# Patient Record
Sex: MALE | Race: WHITE | NOT HISPANIC OR LATINO | Employment: FULL TIME | URBAN - METROPOLITAN AREA
[De-identification: names, ages, dates, MRNs, and addresses within clinical notes are randomized per-mention and may not be internally consistent; named-entity substitution may affect disease eponyms.]

---

## 2017-12-11 ENCOUNTER — HOSPITAL ENCOUNTER (OUTPATIENT)
Dept: LAB | Facility: MEDICAL CENTER | Age: 27
End: 2017-12-11
Attending: INTERNAL MEDICINE
Payer: COMMERCIAL

## 2017-12-11 ENCOUNTER — OFFICE VISIT (OUTPATIENT)
Dept: INTERNAL MEDICINE | Facility: MEDICAL CENTER | Age: 27
End: 2017-12-11
Payer: COMMERCIAL

## 2017-12-11 VITALS
SYSTOLIC BLOOD PRESSURE: 112 MMHG | HEART RATE: 86 BPM | DIASTOLIC BLOOD PRESSURE: 76 MMHG | OXYGEN SATURATION: 94 % | TEMPERATURE: 98.3 F | HEIGHT: 74 IN | WEIGHT: 228 LBS | BODY MASS INDEX: 29.26 KG/M2

## 2017-12-11 DIAGNOSIS — J06.9 UPPER RESPIRATORY TRACT INFECTION, UNSPECIFIED TYPE: ICD-10-CM

## 2017-12-11 DIAGNOSIS — E66.3 OVERWEIGHT (BMI 25.0-29.9): ICD-10-CM

## 2017-12-11 DIAGNOSIS — Z29.9 PREVENTIVE MEASURE: ICD-10-CM

## 2017-12-11 LAB
ALBUMIN SERPL BCP-MCNC: 3.9 G/DL (ref 3.2–4.9)
ALBUMIN/GLOB SERPL: 1.6 G/DL
ALP SERPL-CCNC: 58 U/L (ref 30–99)
ALT SERPL-CCNC: 35 U/L (ref 2–50)
ANION GAP SERPL CALC-SCNC: 6 MMOL/L (ref 0–11.9)
AST SERPL-CCNC: 18 U/L (ref 12–45)
BASOPHILS # BLD AUTO: 0.6 % (ref 0–1.8)
BASOPHILS # BLD: 0.04 K/UL (ref 0–0.12)
BILIRUB SERPL-MCNC: 0.2 MG/DL (ref 0.1–1.5)
BUN SERPL-MCNC: 10 MG/DL (ref 8–22)
CALCIUM SERPL-MCNC: 9 MG/DL (ref 8.5–10.5)
CHLORIDE SERPL-SCNC: 103 MMOL/L (ref 96–112)
CO2 SERPL-SCNC: 29 MMOL/L (ref 20–33)
CREAT SERPL-MCNC: 0.68 MG/DL (ref 0.5–1.4)
EOSINOPHIL # BLD AUTO: 0.17 K/UL (ref 0–0.51)
EOSINOPHIL NFR BLD: 2.5 % (ref 0–6.9)
ERYTHROCYTE [DISTWIDTH] IN BLOOD BY AUTOMATED COUNT: 39.3 FL (ref 35.9–50)
FLUAV+FLUBV AG SPEC QL IA: NEGATIVE
GFR SERPL CREATININE-BSD FRML MDRD: >60 ML/MIN/1.73 M 2
GLOBULIN SER CALC-MCNC: 2.5 G/DL (ref 1.9–3.5)
GLUCOSE SERPL-MCNC: 88 MG/DL (ref 65–99)
HCT VFR BLD AUTO: 44.1 % (ref 42–52)
HETEROPH AB SER QL: NEGATIVE
HGB BLD-MCNC: 15 G/DL (ref 14–18)
IMM GRANULOCYTES # BLD AUTO: 0.02 K/UL (ref 0–0.11)
IMM GRANULOCYTES NFR BLD AUTO: 0.3 % (ref 0–0.9)
INT CON NEG: NEGATIVE
INT CON POS: POSITIVE
LYMPHOCYTES # BLD AUTO: 1.54 K/UL (ref 1–4.8)
LYMPHOCYTES NFR BLD: 22.8 % (ref 22–41)
MCH RBC QN AUTO: 29.1 PG (ref 27–33)
MCHC RBC AUTO-ENTMCNC: 34 G/DL (ref 33.7–35.3)
MCV RBC AUTO: 85.5 FL (ref 81.4–97.8)
MONOCYTES # BLD AUTO: 0.86 K/UL (ref 0–0.85)
MONOCYTES NFR BLD AUTO: 12.8 % (ref 0–13.4)
NEUTROPHILS # BLD AUTO: 4.11 K/UL (ref 1.82–7.42)
NEUTROPHILS NFR BLD: 61 % (ref 44–72)
NRBC # BLD AUTO: 0 K/UL
NRBC BLD AUTO-RTO: 0 /100 WBC
PLATELET # BLD AUTO: 214 K/UL (ref 164–446)
PMV BLD AUTO: 11.5 FL (ref 9–12.9)
POTASSIUM SERPL-SCNC: 3.9 MMOL/L (ref 3.6–5.5)
PROT SERPL-MCNC: 6.4 G/DL (ref 6–8.2)
RBC # BLD AUTO: 5.16 M/UL (ref 4.7–6.1)
SODIUM SERPL-SCNC: 138 MMOL/L (ref 135–145)
WBC # BLD AUTO: 6.7 K/UL (ref 4.8–10.8)

## 2017-12-11 PROCEDURE — 36415 COLL VENOUS BLD VENIPUNCTURE: CPT

## 2017-12-11 PROCEDURE — 86308 HETEROPHILE ANTIBODY SCREEN: CPT

## 2017-12-11 PROCEDURE — 99203 OFFICE O/P NEW LOW 30 MIN: CPT | Mod: 25,GC | Performed by: INTERNAL MEDICINE

## 2017-12-11 PROCEDURE — 85025 COMPLETE CBC W/AUTO DIFF WBC: CPT

## 2017-12-11 PROCEDURE — 87804 INFLUENZA ASSAY W/OPTIC: CPT | Performed by: INTERNAL MEDICINE

## 2017-12-11 PROCEDURE — 80053 COMPREHEN METABOLIC PANEL: CPT

## 2017-12-11 ASSESSMENT — PATIENT HEALTH QUESTIONNAIRE - PHQ9: CLINICAL INTERPRETATION OF PHQ2 SCORE: 0

## 2017-12-11 NOTE — LETTER
December 11, 2017       Patient: Joselito Pool   YOB: 1990   Date of Visit: 12/11/2017         To Whom It May Concern:    It is my medical opinion that Joselito Pool be excused from work today( 12/11/17) and tomorrow ( 12/12/17)    If you have any questions or concerns, please don't hesitate to call [phone]          Sincerely,          Angie Napoles M.D.  Electronically Signed

## 2017-12-12 NOTE — PROGRESS NOTES
New Patient to Establish    Reason to establish: evaluation of new symptoms    CC: cough and fatigue    HPI: 27 yr old male patient with no significant past medical history comes in to establish care and evaluation of ongoing symptoms.  Patient states he started not feeling well about 2 days ago.   Patient has been having dry cough, runny nose, sneezing, watery eyes, fatigue, generalized body aches, mild sinus pains for the past 2 days. He states he does not know if had any sick contacts as he works as .  Patient states his symptoms have gotten worse today.  He initially had chills but did not have later and reported of no fever.  Patient has had no problem sleeping. He has fatigue and today at work he felt like he was going to pass out.  Denies chest pain, palpitations, SOB, nausea,vomiting,diarrhea,constipation, numbness,tingling, ear pain.  Patient states he has had enlarged tonsils due to infections and was supposed to undergo tonsillectomy as a child but did not happen in the past due to unknown reasons.  Patient does not complain of enlarged tonsils or difficulty swallowing today.  Patient did not take flu vaccination this year.    There are no active problems to display for this patient.      History reviewed. No pertinent past medical history.    No current outpatient prescriptions on file.     No current facility-administered medications for this visit.        Allergies as of 12/11/2017   • (No Known Allergies)       Social History     Social History   • Marital status: Single     Spouse name: N/A   • Number of children: N/A   • Years of education: N/A     Occupational History   • Not on file.     Social History Main Topics   • Smoking status: Current Some Day Smoker     Packs/day: 0.25     Years: 10.00   • Smokeless tobacco: Never Used   • Alcohol use Yes      Comment: occ   • Drug use:      Types: Marijuana   • Sexual activity: Yes     Partners: Female     Birth control/ protection: Condom  "    Other Topics Concern   • Not on file     Social History Narrative   • No narrative on file       Family History   Problem Relation Age of Onset   • Dementia Father    • Cancer Father    • Diabetes Father    • Heart Disease Father    • Hypertension Father    • Hyperlipidemia Father    • Alcohol/Drug Father    • Cancer Maternal Grandmother    • Heart Disease Maternal Grandfather        History reviewed. No pertinent surgical history.    ROS: As per HPI. Additional pertinent symptoms as noted below.    Constitutional: Denies fever/weight changes. Positive for chills, fatigue and generalized body aches  Eyes: Denies changes/pain in vision. Positive for watery eyes.  ENT: Positive for congestion, runny nose, sinus discomfort, gritty throat. Denies ear pain.  Cardiovascular: Denies chest pain /palpitations/edema.   Respiratory: Denies SOB/PND/orthopnea.Positive for dry cough.  Abdomen: Denies difficulty swallowing/ diarrhea/constipation/abdominal pain/nausea/vomiting  Genitourinary: Normal urinary habits.   Musculo-skeletal: normal ambulation.Positive for muscle aches.  Skin: Denies rash/lesions.  Neurological: Denies weakness/tingling/numbness/headache  Psychological: good mood and cooperative. Denies anxiety /depression       /76   Pulse 86   Temp 36.8 °C (98.3 °F)   Ht 1.88 m (6' 2\")   Wt 103.4 kg (228 lb)   SpO2 94%   BMI 29.27 kg/m²     Physical Exam  General:  Alert and oriented, No apparent distress.    Eyes: Pupils equal and reactive. No scleral icterus.    Throat: Clear no erythema or exudates noted. Moderately enlarged tonsils.    Neck: Supple. No lymphadenopathy noted. Thyroid not enlarged.    Lungs: Clear to auscultation and percussion bilaterally.    Cardiovascular: Regular rate and rhythm. No murmurs, rubs or gallops.    Abdomen:  Benign. No rebound or guarding noted.    Extremities: No clubbing, cyanosis, edema.    Skin: Clear. No rash or suspicious skin lesions noted.    Neurological: " Oriented to time, place, and person .Cranial nerves intact. No motor/sensory deficits.Reflexes were normal and symmetrical in both upper and lower extremities     Musculoskeletal : NROM of all extremities. No tenderness or deformity noted.       Assessment and Plan    1. Upper respiratory tract infection, unspecified type  - likely due to viral infection  - patient declines any medications use  - influenza swab negative in the clinic  - ordered CBC,CMP, Heterophile antibody screen  - advised to use humidifier, salt water gargling, drink plenty of fluids, wash hands thoroughly with soap to avoid spreading infection  - encouraged adequate rest and hydrate with soups and fluids  - advised and counseled to quit smoking    2. Preventive measure  - declines flu vaccination  - had tetanus vaccination in the past within last 10 yrs.  - not a candidate for DEXA/colonoscopy.  - mammogram or pap smear not applicable  - counseled to quit smoking and patient is willing to quit.  - drinks alcohol occasionally and uses marijuana.    3. Overweight (BMI 25.0-29.9)  - patient advised to eat healthy- DASH diet and exercise regularly atleast 30 mins daily for 5 days a week to lose weight.  - will continue to follow up      Risk Assessment (discuss potential complications a function of chronic problems): spent 35 mins explaining possible complications of his current condition. Educated and counseled about the plans of management    Complexity (discuss number of co-morbidities): discussed URI, overweight, smoking cessation, vaccinations and enlarged tonsils.    Signed by: Angie Napoles M.D.

## 2018-04-06 ENCOUNTER — HOSPITAL ENCOUNTER (EMERGENCY)
Facility: MEDICAL CENTER | Age: 28
End: 2018-04-06
Attending: EMERGENCY MEDICINE
Payer: COMMERCIAL

## 2018-04-06 ENCOUNTER — APPOINTMENT (OUTPATIENT)
Dept: RADIOLOGY | Facility: MEDICAL CENTER | Age: 28
End: 2018-04-06
Attending: EMERGENCY MEDICINE
Payer: COMMERCIAL

## 2018-04-06 VITALS
HEIGHT: 74 IN | DIASTOLIC BLOOD PRESSURE: 82 MMHG | RESPIRATION RATE: 14 BRPM | HEART RATE: 56 BPM | SYSTOLIC BLOOD PRESSURE: 122 MMHG | OXYGEN SATURATION: 97 % | WEIGHT: 222.66 LBS | BODY MASS INDEX: 28.58 KG/M2 | TEMPERATURE: 97.8 F

## 2018-04-06 DIAGNOSIS — N50.812 PAIN IN LEFT TESTICLE: ICD-10-CM

## 2018-04-06 LAB
ANION GAP SERPL CALC-SCNC: 9 MMOL/L (ref 0–11.9)
APPEARANCE UR: ABNORMAL
BACTERIA #/AREA URNS HPF: NEGATIVE /HPF
BASOPHILS # BLD AUTO: 0.6 % (ref 0–1.8)
BASOPHILS # BLD: 0.05 K/UL (ref 0–0.12)
BILIRUB UR QL STRIP.AUTO: NEGATIVE
BUN SERPL-MCNC: 16 MG/DL (ref 8–22)
CALCIUM SERPL-MCNC: 10.1 MG/DL (ref 8.5–10.5)
CHLORIDE SERPL-SCNC: 105 MMOL/L (ref 96–112)
CO2 SERPL-SCNC: 22 MMOL/L (ref 20–33)
COLOR UR: YELLOW
CREAT SERPL-MCNC: 0.67 MG/DL (ref 0.5–1.4)
CULTURE IF INDICATED INDCX: YES UA CULTURE
EOSINOPHIL # BLD AUTO: 0.07 K/UL (ref 0–0.51)
EOSINOPHIL NFR BLD: 0.8 % (ref 0–6.9)
EPI CELLS #/AREA URNS HPF: ABNORMAL /HPF
ERYTHROCYTE [DISTWIDTH] IN BLOOD BY AUTOMATED COUNT: 38.9 FL (ref 35.9–50)
GLUCOSE SERPL-MCNC: 95 MG/DL (ref 65–99)
GLUCOSE UR STRIP.AUTO-MCNC: NEGATIVE MG/DL
HCT VFR BLD AUTO: 47.1 % (ref 42–52)
HGB BLD-MCNC: 16.7 G/DL (ref 14–18)
HYALINE CASTS #/AREA URNS LPF: ABNORMAL /LPF
IMM GRANULOCYTES # BLD AUTO: 0.02 K/UL (ref 0–0.11)
IMM GRANULOCYTES NFR BLD AUTO: 0.2 % (ref 0–0.9)
KETONES UR STRIP.AUTO-MCNC: ABNORMAL MG/DL
LEUKOCYTE ESTERASE UR QL STRIP.AUTO: ABNORMAL
LYMPHOCYTES # BLD AUTO: 2.06 K/UL (ref 1–4.8)
LYMPHOCYTES NFR BLD: 24.2 % (ref 22–41)
MCH RBC QN AUTO: 29.9 PG (ref 27–33)
MCHC RBC AUTO-ENTMCNC: 35.5 G/DL (ref 33.7–35.3)
MCV RBC AUTO: 84.3 FL (ref 81.4–97.8)
MICRO URNS: ABNORMAL
MONOCYTES # BLD AUTO: 0.75 K/UL (ref 0–0.85)
MONOCYTES NFR BLD AUTO: 8.8 % (ref 0–13.4)
NEUTROPHILS # BLD AUTO: 5.58 K/UL (ref 1.82–7.42)
NEUTROPHILS NFR BLD: 65.4 % (ref 44–72)
NITRITE UR QL STRIP.AUTO: NEGATIVE
NRBC # BLD AUTO: 0 K/UL
NRBC BLD-RTO: 0 /100 WBC
PH UR STRIP.AUTO: 8.5 [PH]
PLATELET # BLD AUTO: 214 K/UL (ref 164–446)
PMV BLD AUTO: 10.7 FL (ref 9–12.9)
POTASSIUM SERPL-SCNC: 3.7 MMOL/L (ref 3.6–5.5)
PROT UR QL STRIP: NEGATIVE MG/DL
RBC # BLD AUTO: 5.59 M/UL (ref 4.7–6.1)
RBC # URNS HPF: ABNORMAL /HPF
RBC UR QL AUTO: NEGATIVE
SODIUM SERPL-SCNC: 136 MMOL/L (ref 135–145)
SP GR UR STRIP.AUTO: 1.03
UROBILINOGEN UR STRIP.AUTO-MCNC: 0.2 MG/DL
WBC # BLD AUTO: 8.5 K/UL (ref 4.8–10.8)
WBC #/AREA URNS HPF: ABNORMAL /HPF

## 2018-04-06 PROCEDURE — 85025 COMPLETE CBC W/AUTO DIFF WBC: CPT

## 2018-04-06 PROCEDURE — 80048 BASIC METABOLIC PNL TOTAL CA: CPT

## 2018-04-06 PROCEDURE — 76870 US EXAM SCROTUM: CPT

## 2018-04-06 PROCEDURE — 87086 URINE CULTURE/COLONY COUNT: CPT

## 2018-04-06 PROCEDURE — 99284 EMERGENCY DEPT VISIT MOD MDM: CPT

## 2018-04-06 PROCEDURE — 81001 URINALYSIS AUTO W/SCOPE: CPT

## 2018-04-06 NOTE — ED TRIAGE NOTES
Ambulatory to triage with report of  Chief Complaint   Patient presents with   • Testicle Pain     onset today, lasted about 1 hour, now resolved.  reports stood up quickly from under a car then onset of pain.  denies swelling   Given specimen cup in triage with clean catch urine instructions.

## 2018-04-07 NOTE — DISCHARGE INSTRUCTIONS
You were seen and evaluated in the Emergency Department at [facility] for:     Pain in your left testicle that's gotten better    You had the following tests and studies:    Blood tests, urine test, and ultrasound all look great!    This could be intermittent twisting of the testicle; he looks a friend now if you have new or worse pain he must return immediately is in his little as 4-6 hours he could lose her testicle of twisting his indeed occurring.  ----------------------------    Please make sure to follow up with:    Dr Carter, Urology  [facility] to set up a primary doctor  Return to the ER RIGHT AWAY if this happens again or gets any worse.    Good luck!  ----------------------------    We always encourage patients to return IMMEDIATELY if they have:  Increased or changing pain, passing out, fevers over 100.4 (taken in your mouth or rectally) for more than 2 days, redness or swelling of skin or tissues, feeling like your heart is beating fast, chest pain that is new or worsening, trouble breathing, feeling like your throat is closing up and can not breath, inability to walk, weakness of any part of your body, new dizziness, severe bleeding that won't stop from any part of your body, if you can't eat or drink, or if you have any other concerns.   If you feel worse, please know that you can always return with any questions, concerns, worse symptoms, or you are feeling unsafe. We certainly cannot say for sure that we have ruled out every illness or dangerous disease, but we feel that at this specific time, your exam, tests, and vital signs like heart rate and blood pressure are safe for discharge.

## 2018-04-07 NOTE — ED PROVIDER NOTES
"ED PROVIDER NOTE     Scribed for Bill Romero M.D. by Eugenio Cueto. 4/6/2018, 5:42 PM.    CHIEF COMPLAINT  Chief Complaint   Patient presents with   • Testicle Pain     onset today, lasted about 1 hour, now resolved.  reports stood up quickly from under a car then onset of pain.  denies swelling       HPI    Primary care provider: Angie Napoles M.D.  Means of arrival: Walk-in  History obtained from: Patient  History limited by: None    Joselito Pool is a 27 y.o. male who presents with complaints of left testicle pain, onset 4 hours ago. Patient explains that he was standing up after being under a truck and explains that he had \"sharp\" pain to his left testicle. He has not experienced associated swelling or abdominal pain. Patient also explains that he has this pain lasted for about one hour, but has since resolved. He also denies penile discharge, vomiting, diarrhea, cough, rhinorrhea, or hematuria. The patient does explain he has experienced this one other time in the past and notes that this was over 1.5 years ago. He denies any associated symptoms. No alleviating measures attempted. Denies any bulge or skin changes.     REVIEW OF SYSTEMS  Constitutional: Negative for fever or chills.   HENT: Negative for nosebleeds or sore throat.    Eyes: Negative for vision changes or discharge.   Gastrointestinal: Negative for nausea, vomiting, abdominal pain.   Genitourinary: Negative for dysuria or flank pain. Positive left testicle pain, resolved.  Musculoskeletal: Negative for back pain or joint pain.   Skin: Negative for itching or rash.   Neurological: Negative for sensory or motor changes.   Endo/Heme/Allergies: Negative for weight changes or hives.   All other systems reviewed and are negative.  C.     PAST MEDICAL HISTORY  None      PAST FAMILY HISTORY  Family History   Problem Relation Age of Onset   • Dementia Father    • Cancer Father    • Diabetes Father    • Heart Disease Father    • Hypertension " "Father    • Hyperlipidemia Father    • Alcohol/Drug Father    • Cancer Maternal Grandmother    • Heart Disease Maternal Grandfather        SOCIAL HISTORY  Social History     Social History Main Topics   • Smoking status: Current Some Day Smoker     Packs/day: 0.25     Years: 10.00   • Smokeless tobacco: Never Used   • Alcohol use Yes      Comment: occ   • Drug use: No      Comment: quit 2017   • Sexual activity: Yes     Partners: Female     Birth control/ protection: Condom       SURGICAL HISTORY  patient denies any surgical history    CURRENT MEDICATIONS  Home Medications     Reviewed by Carmelina Lopez, Pharmacy Intern (Pharmacy Intern) on 04/06/18 at 2028  Med List Status: Complete   Medication Last Dose Status        Patient Cong Taking any Medications                       ALLERGIES  No Known Allergies    PHYSICAL EXAM  VITAL SIGNS: /87   Pulse 82   Temp 36.8 °C (98.2 °F)   Resp 16   Ht 1.88 m (6' 2\")   Wt 101 kg (222 lb 10.6 oz)   SpO2 95%   BMI 28.59 kg/m²    Pulse ox interpretation: On room air, I interpret this pulse ox as normal.  Constitutional: No distress. Well-nourished.  HENT: Head is atraumatic. Mucous membranes moist.   Eyes: Conjunctivae are normal. EOMI.   Respiratory: No respiratory distress. Equal chest expansion.   Cardiovascular: RRR, no m/r/g.  Abdomen: Soft, nontender.  : Normal bilateral cremasteric reflex. No testicular masses or hernias. Normal circumcised penis. Normal testicular lie bilaterally. No skin changes.   Musculoskeletal: Normal range of motion. No edema.   Neurological: Alert. No focal deficits noted.    Skin: No rash. No Pallor.   Psych: Appropriate mood. Normal affect.      DIAGNOSTIC STUDIES / PROCEDURES    LABS & EKG  Results for orders placed or performed during the hospital encounter of 04/06/18   URINALYSIS,CULTURE IF INDICATED   Result Value Ref Range    Color Yellow     Character Turbid (A)     Specific Gravity 1.030 <1.035    Ph 8.5 (A) 5.0 - 8.0    " Glucose Negative Negative mg/dL    Ketones Trace (A) Negative mg/dL    Protein Negative Negative mg/dL    Bilirubin Negative Negative    Urobilinogen, Urine 0.2 Negative    Nitrite Negative Negative    Leukocyte Esterase Trace (A) Negative    Occult Blood Negative Negative    Micro Urine Req Microscopic     Culture Indicated Yes UA Culture   CBC WITH DIFFERENTIAL   Result Value Ref Range    WBC 8.5 4.8 - 10.8 K/uL    RBC 5.59 4.70 - 6.10 M/uL    Hemoglobin 16.7 14.0 - 18.0 g/dL    Hematocrit 47.1 42.0 - 52.0 %    MCV 84.3 81.4 - 97.8 fL    MCH 29.9 27.0 - 33.0 pg    MCHC 35.5 (H) 33.7 - 35.3 g/dL    RDW 38.9 35.9 - 50.0 fL    Platelet Count 214 164 - 446 K/uL    MPV 10.7 9.0 - 12.9 fL    Neutrophils-Polys 65.40 44.00 - 72.00 %    Lymphocytes 24.20 22.00 - 41.00 %    Monocytes 8.80 0.00 - 13.40 %    Eosinophils 0.80 0.00 - 6.90 %    Basophils 0.60 0.00 - 1.80 %    Immature Granulocytes 0.20 0.00 - 0.90 %    Nucleated RBC 0.00 /100 WBC    Neutrophils (Absolute) 5.58 1.82 - 7.42 K/uL    Lymphs (Absolute) 2.06 1.00 - 4.80 K/uL    Monos (Absolute) 0.75 0.00 - 0.85 K/uL    Eos (Absolute) 0.07 0.00 - 0.51 K/uL    Baso (Absolute) 0.05 0.00 - 0.12 K/uL    Immature Granulocytes (abs) 0.02 0.00 - 0.11 K/uL    NRBC (Absolute) 0.00 K/uL   BMP   Result Value Ref Range    Sodium 136 135 - 145 mmol/L    Potassium 3.7 3.6 - 5.5 mmol/L    Chloride 105 96 - 112 mmol/L    Co2 22 20 - 33 mmol/L    Glucose 95 65 - 99 mg/dL    Bun 16 8 - 22 mg/dL    Creatinine 0.67 0.50 - 1.40 mg/dL    Calcium 10.1 8.5 - 10.5 mg/dL    Anion Gap 9.0 0.0 - 11.9   ESTIMATED GFR   Result Value Ref Range    GFR If African American >60 >60 mL/min/1.73 m 2    GFR If Non African American >60 >60 mL/min/1.73 m 2   URINE MICROSCOPIC (W/UA)   Result Value Ref Range    WBC 0-2 (A) /hpf    RBC 5-10 (A) /hpf    Bacteria Negative None /hpf    Epithelial Cells Few /hpf    Hyaline Cast 0-2 /lpf         RADIOLOGY  [id number]   Final Result      No testicular  masses.   Bilateral arterial flow within both testes.   No increased vascularity left epididymis nonspecific. Inflammatory changes not excluded.        COURSE & MEDICAL DECISION MAKING    This is a 27 y.o. male who presents with resolved left testicle pain.     Differential Diagnosis includes but is not limited to:  Hernia, torsion, strain, intermittent torsion, UTI    ED Course:  Plan labs, ultrasound, reevaluation.  Cbc with normal wbc, no acidosis on chemistry.  Normal testicular ultrasound.  On recheck patient still asymptomatic.     8:16 PM Paged Urology.     8:24 PM I discussed the patient's case and the above findings with Dr. Oneill (Urology) who is happy to follow up with the patient in clinic as I'm concerned for intermittent torsion with no acute threat to testicle given normal flow today.     Normal flow doubt acute torsion. No e/o hernia on physical. No masses on ultrasound.     8:26 PM - Patient was reevaluated at bedside. He feels well at this time and is sitting comfortably on the gurney. Discussed plans of discharge with the patient including plans of follow-up with Dr. Oneill as the most dangerous cause could be intermittent torsion. He's very reliable and I feel he'll heed my instructions to return immediately for any new pain, given threat to viable testicle in 4-6 hours of pain onset. Patient and wife understand and verbalized agreement with the plan of care.     Medications - No data to display    FINAL IMPRESSION  1. Pain in left testicle        PRESCRIPTIONS  There are no discharge medications for this patient.    FOLLOW UP  Angie Napoles M.D.  [address]  [address]  [facility] [id number] [phone]    Schedule an appointment as soon as possible for a visit in 2 days      Mohinder Oneill M.D.  [address]  [facility] [address]  [phone]    Schedule an appointment as soon as possible for a visit in 3 days      [facility], Emergency Dept  [address]  [place]  [place] [id number] [phone]  Today  If symptoms worsen    -DISCHARGE-    The patient is referred to a primary physician for blood pressure management, diabetic screening, and for all other preventative health concerns. F/U with Urology for a f/u of today's complaint.     Pertinent Labs & Imaging studies reviewed and verified by myself, as well as nursing notes and the patient's past medical, family, and social histories (See chart for details).    Results, exam findings, clinical impression, presumed diagnosis, treatment options, and strict return precautions were discussed with the patient and family, and they verbalized understanding, agreed with, and appreciated the plan of care.    Eugenio DILL (Scribe), am scribing for, and in the presence of, Bill Romero M.D..    Electronically signed by: Eugenio Cueto (Scribe), 4/6/2018    Bill DILL M.D. personally performed the services described in this documentation, as scribed by Eugenio Cueto in my presence, and it is both accurate and complete.    The note accurately reflects work and decisions made by me.  Bill Romero  4/7/2018  10:41 AM

## 2018-04-07 NOTE — ED NOTES
All discharge instructions given to patient. Patient verbalized understanding and has no further questions. Patient gathered all belongings and ambulated with steady gait to ER lobby. See vital signs for discharge vitals. Patient educated on dangers of operating a vehicle after administration of narcotics and/or sedatives. Patient to follow up as instructed in discharge instructions and/or return to the Emergency Room if signs/symptoms worsen or do not improve. Pt to return immediately to the ER if testicular pain returns.

## 2018-04-08 LAB
BACTERIA UR CULT: NORMAL
SIGNIFICANT IND 70042: NORMAL
SITE SITE: NORMAL
SOURCE SOURCE: NORMAL

## 2018-11-30 ENCOUNTER — OFFICE VISIT (OUTPATIENT)
Dept: URGENT CARE | Facility: CLINIC | Age: 28
End: 2018-11-30
Payer: COMMERCIAL

## 2018-11-30 VITALS
HEART RATE: 68 BPM | SYSTOLIC BLOOD PRESSURE: 118 MMHG | WEIGHT: 222 LBS | TEMPERATURE: 98.3 F | BODY MASS INDEX: 28.49 KG/M2 | DIASTOLIC BLOOD PRESSURE: 72 MMHG | RESPIRATION RATE: 14 BRPM | OXYGEN SATURATION: 96 % | HEIGHT: 74 IN

## 2018-11-30 DIAGNOSIS — J06.9 VIRAL URI: ICD-10-CM

## 2018-11-30 PROCEDURE — 99203 OFFICE O/P NEW LOW 30 MIN: CPT | Performed by: PHYSICIAN ASSISTANT

## 2018-11-30 ASSESSMENT — ENCOUNTER SYMPTOMS
DIARRHEA: 0
RHINORRHEA: 1
SWOLLEN GLANDS: 0
ABDOMINAL PAIN: 0
SINUS PAIN: 0
COUGH: 0
SORE THROAT: 1
SHORTNESS OF BREATH: 0
SPUTUM PRODUCTION: 0
FEVER: 0
NECK PAIN: 0
PALPITATIONS: 0
NAUSEA: 1
TINGLING: 0
VOMITING: 1
WHEEZING: 0
HEADACHES: 0
MYALGIAS: 0
FOCAL WEAKNESS: 0
SENSORY CHANGE: 0
CHILLS: 0

## 2018-11-30 NOTE — LETTER
November 30, 2018         Patient: Joselito Pool   YOB: 1990   Date of Visit: 11/30/2018           To Whom it May Concern:    Joselito Pool was seen in my clinic on 11/30/2018. He is excused from work from 11/29/18 and 11/30/18 due to medical reasons.    If you have any questions or concerns, please don't hesitate to call.        Sincerely,           Julita Boateng P.A.-C.  Electronically Signed

## 2018-11-30 NOTE — PROGRESS NOTES
"Subjective:      Joselito Pool is a 28 y.o. male who presents with Cold Symptoms            URI    This is a new problem. Episode onset: 2 days  The problem has been unchanged. Associated symptoms include congestion, nausea, rhinorrhea, a sore throat and vomiting. Pertinent negatives include no abdominal pain, chest pain, coughing, diarrhea, ear pain, headaches, neck pain, plugged ear sensation, rash, sinus pain, sneezing, swollen glands or wheezing. He has tried nothing for the symptoms.         No past medical history on file.    No past surgical history on file.    Family History   Problem Relation Age of Onset   • Dementia Father    • Cancer Father    • Diabetes Father    • Heart Disease Father    • Hypertension Father    • Hyperlipidemia Father    • Alcohol/Drug Father    • Cancer Maternal Grandmother    • Heart Disease Maternal Grandfather        No Known Allergies    Medications, Allergies, and current problem list reviewed today in Epic    Review of Systems   Constitutional: Positive for malaise/fatigue. Negative for chills and fever.   HENT: Positive for congestion, rhinorrhea and sore throat. Negative for ear discharge, ear pain, sinus pain and sneezing.    Respiratory: Negative for cough, sputum production, shortness of breath and wheezing.    Cardiovascular: Negative for chest pain, palpitations and leg swelling.   Gastrointestinal: Positive for nausea and vomiting. Negative for abdominal pain and diarrhea.   Musculoskeletal: Negative for myalgias and neck pain.   Skin: Negative for rash.   Neurological: Negative for tingling, sensory change, focal weakness and headaches.     All other systems reviewed and are negative.        Objective:     /72 (BP Location: Right arm, Patient Position: Sitting, BP Cuff Size: Adult)   Pulse 68   Temp 36.8 °C (98.3 °F) (Temporal)   Resp 14   Ht 1.88 m (6' 2\")   Wt 100.7 kg (222 lb)   SpO2 96%   BMI 28.50 kg/m²      Physical Exam   Constitutional: He is " oriented to person, place, and time. He appears well-developed and well-nourished. No distress.   HENT:   Head: Normocephalic and atraumatic.   Right Ear: Tympanic membrane, external ear and ear canal normal.   Left Ear: Tympanic membrane, external ear and ear canal normal.   Nose: Mucosal edema and rhinorrhea present.   Mouth/Throat: Uvula is midline, oropharynx is clear and moist and mucous membranes are normal.   Eyes: Conjunctivae are normal.   Neck: Neck supple.   Cardiovascular: Normal rate, regular rhythm and normal heart sounds.  Exam reveals no gallop and no friction rub.    No murmur heard.  Pulmonary/Chest: Effort normal and breath sounds normal. No respiratory distress. He has no wheezes. He has no rales.   Lymphadenopathy:     He has no cervical adenopathy.   Neurological: He is alert and oriented to person, place, and time. No cranial nerve deficit.   Skin: Skin is warm and dry. No rash noted.   Psychiatric: He has a normal mood and affect. His behavior is normal. Judgment and thought content normal.               Assessment/Plan:     1. Viral URI         Tobacco cessation counseling was provided to the patient for a duration of three to seven minutes.  Tobacco effects, benefits of cessation and methods to use to achieve this goal were briefly discussed.     Viral etiology discussed. No signs of bacterial illness on exam.  Encouraged conservative treatment.   Suggested fluids, rest, humidification.     Differential diagnoses, Supportive care, and indications for immediate follow-up discussed with patient.   Instructed to return to clinic or nearest emergency department for any change in condition, further concerns, or worsening of symptoms.    The patient demonstrated a good understanding and agreed with the treatment plan.    Julita Boateng P.A.-C.

## 2018-12-11 ENCOUNTER — HOSPITAL ENCOUNTER (EMERGENCY)
Facility: MEDICAL CENTER | Age: 28
End: 2018-12-11
Attending: EMERGENCY MEDICINE
Payer: COMMERCIAL

## 2018-12-11 ENCOUNTER — APPOINTMENT (OUTPATIENT)
Dept: RADIOLOGY | Facility: MEDICAL CENTER | Age: 28
End: 2018-12-11
Attending: EMERGENCY MEDICINE
Payer: COMMERCIAL

## 2018-12-11 VITALS
DIASTOLIC BLOOD PRESSURE: 76 MMHG | WEIGHT: 214.95 LBS | SYSTOLIC BLOOD PRESSURE: 125 MMHG | HEIGHT: 74 IN | RESPIRATION RATE: 18 BRPM | TEMPERATURE: 97.6 F | BODY MASS INDEX: 27.59 KG/M2 | OXYGEN SATURATION: 98 % | HEART RATE: 71 BPM

## 2018-12-11 DIAGNOSIS — R10.13 EPIGASTRIC PAIN: ICD-10-CM

## 2018-12-11 DIAGNOSIS — R11.2 NON-INTRACTABLE VOMITING WITH NAUSEA, UNSPECIFIED VOMITING TYPE: ICD-10-CM

## 2018-12-11 LAB
ALBUMIN SERPL BCP-MCNC: 4.4 G/DL (ref 3.2–4.9)
ALBUMIN/GLOB SERPL: 1.6 G/DL
ALP SERPL-CCNC: 65 U/L (ref 30–99)
ALT SERPL-CCNC: 42 U/L (ref 2–50)
ANION GAP SERPL CALC-SCNC: 8 MMOL/L (ref 0–11.9)
AST SERPL-CCNC: 24 U/L (ref 12–45)
BASOPHILS # BLD AUTO: 0.4 % (ref 0–1.8)
BASOPHILS # BLD: 0.03 K/UL (ref 0–0.12)
BILIRUB SERPL-MCNC: 0.8 MG/DL (ref 0.1–1.5)
BUN SERPL-MCNC: 12 MG/DL (ref 8–22)
CALCIUM SERPL-MCNC: 9.2 MG/DL (ref 8.5–10.5)
CHLORIDE SERPL-SCNC: 104 MMOL/L (ref 96–112)
CO2 SERPL-SCNC: 27 MMOL/L (ref 20–33)
CREAT SERPL-MCNC: 0.66 MG/DL (ref 0.5–1.4)
EOSINOPHIL # BLD AUTO: 0.01 K/UL (ref 0–0.51)
EOSINOPHIL NFR BLD: 0.1 % (ref 0–6.9)
ERYTHROCYTE [DISTWIDTH] IN BLOOD BY AUTOMATED COUNT: 37.6 FL (ref 35.9–50)
GLOBULIN SER CALC-MCNC: 2.7 G/DL (ref 1.9–3.5)
GLUCOSE SERPL-MCNC: 118 MG/DL (ref 65–99)
HCT VFR BLD AUTO: 46.7 % (ref 42–52)
HGB BLD-MCNC: 16.5 G/DL (ref 14–18)
IMM GRANULOCYTES # BLD AUTO: 0.04 K/UL (ref 0–0.11)
IMM GRANULOCYTES NFR BLD AUTO: 0.5 % (ref 0–0.9)
LIPASE SERPL-CCNC: 11 U/L (ref 11–82)
LYMPHOCYTES # BLD AUTO: 1.54 K/UL (ref 1–4.8)
LYMPHOCYTES NFR BLD: 20.6 % (ref 22–41)
MCH RBC QN AUTO: 29.3 PG (ref 27–33)
MCHC RBC AUTO-ENTMCNC: 35.3 G/DL (ref 33.7–35.3)
MCV RBC AUTO: 82.8 FL (ref 81.4–97.8)
MONOCYTES # BLD AUTO: 0.51 K/UL (ref 0–0.85)
MONOCYTES NFR BLD AUTO: 6.8 % (ref 0–13.4)
NEUTROPHILS # BLD AUTO: 5.36 K/UL (ref 1.82–7.42)
NEUTROPHILS NFR BLD: 71.6 % (ref 44–72)
NRBC # BLD AUTO: 0 K/UL
NRBC BLD-RTO: 0 /100 WBC
PLATELET # BLD AUTO: 208 K/UL (ref 164–446)
PMV BLD AUTO: 10.8 FL (ref 9–12.9)
POTASSIUM SERPL-SCNC: 3.1 MMOL/L (ref 3.6–5.5)
PROT SERPL-MCNC: 7.1 G/DL (ref 6–8.2)
RBC # BLD AUTO: 5.64 M/UL (ref 4.7–6.1)
SODIUM SERPL-SCNC: 139 MMOL/L (ref 135–145)
WBC # BLD AUTO: 7.5 K/UL (ref 4.8–10.8)

## 2018-12-11 PROCEDURE — 99285 EMERGENCY DEPT VISIT HI MDM: CPT

## 2018-12-11 PROCEDURE — 96376 TX/PRO/DX INJ SAME DRUG ADON: CPT

## 2018-12-11 PROCEDURE — 96375 TX/PRO/DX INJ NEW DRUG ADDON: CPT

## 2018-12-11 PROCEDURE — 96374 THER/PROPH/DIAG INJ IV PUSH: CPT

## 2018-12-11 PROCEDURE — 700105 HCHG RX REV CODE 258: Performed by: EMERGENCY MEDICINE

## 2018-12-11 PROCEDURE — 700111 HCHG RX REV CODE 636 W/ 250 OVERRIDE (IP): Performed by: EMERGENCY MEDICINE

## 2018-12-11 PROCEDURE — 83690 ASSAY OF LIPASE: CPT

## 2018-12-11 PROCEDURE — 85025 COMPLETE CBC W/AUTO DIFF WBC: CPT

## 2018-12-11 PROCEDURE — 80053 COMPREHEN METABOLIC PANEL: CPT

## 2018-12-11 PROCEDURE — 76705 ECHO EXAM OF ABDOMEN: CPT

## 2018-12-11 PROCEDURE — 36415 COLL VENOUS BLD VENIPUNCTURE: CPT

## 2018-12-11 RX ORDER — MORPHINE SULFATE 10 MG/ML
4 INJECTION, SOLUTION INTRAMUSCULAR; INTRAVENOUS ONCE
Status: COMPLETED | OUTPATIENT
Start: 2018-12-11 | End: 2018-12-11

## 2018-12-11 RX ORDER — ONDANSETRON 2 MG/ML
4 INJECTION INTRAMUSCULAR; INTRAVENOUS ONCE
Status: COMPLETED | OUTPATIENT
Start: 2018-12-11 | End: 2018-12-11

## 2018-12-11 RX ORDER — ONDANSETRON 2 MG/ML
4 INJECTION INTRAMUSCULAR; INTRAVENOUS ONCE
Status: DISCONTINUED | OUTPATIENT
Start: 2018-12-11 | End: 2018-12-11 | Stop reason: HOSPADM

## 2018-12-11 RX ORDER — MORPHINE SULFATE 10 MG/ML
4 INJECTION, SOLUTION INTRAMUSCULAR; INTRAVENOUS ONCE
Status: DISCONTINUED | OUTPATIENT
Start: 2018-12-11 | End: 2018-12-11 | Stop reason: HOSPADM

## 2018-12-11 RX ORDER — OMEPRAZOLE 20 MG/1
20 TABLET, DELAYED RELEASE ORAL DAILY
Qty: 30 TAB | Refills: 0 | Status: SHIPPED | OUTPATIENT
Start: 2018-12-11

## 2018-12-11 RX ORDER — SODIUM CHLORIDE 9 MG/ML
1000 INJECTION, SOLUTION INTRAVENOUS ONCE
Status: COMPLETED | OUTPATIENT
Start: 2018-12-11 | End: 2018-12-11

## 2018-12-11 RX ORDER — SUCRALFATE ORAL 1 G/10ML
1 SUSPENSION ORAL 4 TIMES DAILY
Qty: 500 ML | Refills: 0 | Status: SHIPPED | OUTPATIENT
Start: 2018-12-11

## 2018-12-11 RX ORDER — ONDANSETRON 4 MG/1
4 TABLET, FILM COATED ORAL EVERY 6 HOURS PRN
Qty: 10 EACH | Refills: 0 | Status: SHIPPED | OUTPATIENT
Start: 2018-12-11

## 2018-12-11 RX ADMIN — SODIUM CHLORIDE 1000 ML: 9 INJECTION, SOLUTION INTRAVENOUS at 15:00

## 2018-12-11 RX ADMIN — MORPHINE SULFATE 4 MG: 10 INJECTION INTRAVENOUS at 15:00

## 2018-12-11 RX ADMIN — ONDANSETRON HYDROCHLORIDE 4 MG: 2 INJECTION, SOLUTION INTRAMUSCULAR; INTRAVENOUS at 15:00

## 2018-12-11 RX ADMIN — ONDANSETRON HYDROCHLORIDE 4 MG: 2 INJECTION, SOLUTION INTRAMUSCULAR; INTRAVENOUS at 17:00

## 2018-12-11 RX ADMIN — MORPHINE SULFATE 4 MG: 10 INJECTION INTRAVENOUS at 17:00

## 2018-12-11 ASSESSMENT — PAIN SCALES - GENERAL
PAINLEVEL_OUTOF10: 8
PAINLEVEL_OUTOF10: 10

## 2018-12-11 NOTE — ED TRIAGE NOTES
Chief Complaint   Patient presents with   • Abdominal Pain     pt states waking in the middle of the night vomiting/ states coffee grounds in emesis/ hx of ulcer and vomiting   • Vomiting   • N/V     Explained to pt triage process, made pt aware to tell this RN/staff of any changes/concerns, pt verbalized understanding of process and instructions given. Pt to ER lobby.

## 2018-12-12 ENCOUNTER — APPOINTMENT (OUTPATIENT)
Dept: RADIOLOGY | Facility: MEDICAL CENTER | Age: 28
End: 2018-12-12
Attending: EMERGENCY MEDICINE
Payer: COMMERCIAL

## 2018-12-12 ENCOUNTER — HOSPITAL ENCOUNTER (EMERGENCY)
Facility: MEDICAL CENTER | Age: 28
End: 2018-12-13
Attending: EMERGENCY MEDICINE
Payer: COMMERCIAL

## 2018-12-12 VITALS
WEIGHT: 215 LBS | HEART RATE: 82 BPM | BODY MASS INDEX: 27.59 KG/M2 | SYSTOLIC BLOOD PRESSURE: 137 MMHG | TEMPERATURE: 99.1 F | DIASTOLIC BLOOD PRESSURE: 81 MMHG | RESPIRATION RATE: 19 BRPM | HEIGHT: 74 IN | OXYGEN SATURATION: 93 %

## 2018-12-12 DIAGNOSIS — R10.13 EPIGASTRIC ABDOMINAL PAIN: ICD-10-CM

## 2018-12-12 LAB
ALBUMIN SERPL BCP-MCNC: 4.6 G/DL (ref 3.2–4.9)
ALBUMIN/GLOB SERPL: 1.8 G/DL
ALP SERPL-CCNC: 67 U/L (ref 30–99)
ALT SERPL-CCNC: 45 U/L (ref 2–50)
ANION GAP SERPL CALC-SCNC: 11 MMOL/L (ref 0–11.9)
APPEARANCE UR: CLEAR
AST SERPL-CCNC: 24 U/L (ref 12–45)
BASOPHILS # BLD AUTO: 0.5 % (ref 0–1.8)
BASOPHILS # BLD: 0.04 K/UL (ref 0–0.12)
BILIRUB SERPL-MCNC: 1.2 MG/DL (ref 0.1–1.5)
BILIRUB UR QL STRIP.AUTO: NEGATIVE
BUN SERPL-MCNC: 10 MG/DL (ref 8–22)
CALCIUM SERPL-MCNC: 9.3 MG/DL (ref 8.5–10.5)
CHLORIDE SERPL-SCNC: 98 MMOL/L (ref 96–112)
CO2 SERPL-SCNC: 25 MMOL/L (ref 20–33)
COLOR UR: YELLOW
CREAT SERPL-MCNC: 0.69 MG/DL (ref 0.5–1.4)
EOSINOPHIL # BLD AUTO: 0.02 K/UL (ref 0–0.51)
EOSINOPHIL NFR BLD: 0.2 % (ref 0–6.9)
ERYTHROCYTE [DISTWIDTH] IN BLOOD BY AUTOMATED COUNT: 36.3 FL (ref 35.9–50)
GLOBULIN SER CALC-MCNC: 2.6 G/DL (ref 1.9–3.5)
GLUCOSE SERPL-MCNC: 109 MG/DL (ref 65–99)
GLUCOSE UR STRIP.AUTO-MCNC: NEGATIVE MG/DL
HCT VFR BLD AUTO: 44.9 % (ref 42–52)
HGB BLD-MCNC: 15.9 G/DL (ref 14–18)
IMM GRANULOCYTES # BLD AUTO: 0.03 K/UL (ref 0–0.11)
IMM GRANULOCYTES NFR BLD AUTO: 0.4 % (ref 0–0.9)
KETONES UR STRIP.AUTO-MCNC: 40 MG/DL
LEUKOCYTE ESTERASE UR QL STRIP.AUTO: NEGATIVE
LIPASE SERPL-CCNC: 37 U/L (ref 11–82)
LYMPHOCYTES # BLD AUTO: 1.8 K/UL (ref 1–4.8)
LYMPHOCYTES NFR BLD: 22 % (ref 22–41)
MCH RBC QN AUTO: 28.8 PG (ref 27–33)
MCHC RBC AUTO-ENTMCNC: 35.4 G/DL (ref 33.7–35.3)
MCV RBC AUTO: 81.2 FL (ref 81.4–97.8)
MICRO URNS: ABNORMAL
MONOCYTES # BLD AUTO: 0.81 K/UL (ref 0–0.85)
MONOCYTES NFR BLD AUTO: 9.9 % (ref 0–13.4)
NEUTROPHILS # BLD AUTO: 5.49 K/UL (ref 1.82–7.42)
NEUTROPHILS NFR BLD: 67 % (ref 44–72)
NITRITE UR QL STRIP.AUTO: NEGATIVE
NRBC # BLD AUTO: 0 K/UL
NRBC BLD-RTO: 0 /100 WBC
PH UR STRIP.AUTO: 6 [PH]
PLATELET # BLD AUTO: 210 K/UL (ref 164–446)
PMV BLD AUTO: 10 FL (ref 9–12.9)
POTASSIUM SERPL-SCNC: 3.5 MMOL/L (ref 3.6–5.5)
PROT SERPL-MCNC: 7.2 G/DL (ref 6–8.2)
PROT UR QL STRIP: NEGATIVE MG/DL
RBC # BLD AUTO: 5.53 M/UL (ref 4.7–6.1)
RBC UR QL AUTO: NEGATIVE
SODIUM SERPL-SCNC: 134 MMOL/L (ref 135–145)
SP GR UR STRIP.AUTO: 1.01
UROBILINOGEN UR STRIP.AUTO-MCNC: 1 MG/DL
WBC # BLD AUTO: 8.2 K/UL (ref 4.8–10.8)

## 2018-12-12 PROCEDURE — 99285 EMERGENCY DEPT VISIT HI MDM: CPT

## 2018-12-12 PROCEDURE — 96374 THER/PROPH/DIAG INJ IV PUSH: CPT

## 2018-12-12 PROCEDURE — 93005 ELECTROCARDIOGRAM TRACING: CPT

## 2018-12-12 PROCEDURE — 80053 COMPREHEN METABOLIC PANEL: CPT

## 2018-12-12 PROCEDURE — 700111 HCHG RX REV CODE 636 W/ 250 OVERRIDE (IP): Performed by: EMERGENCY MEDICINE

## 2018-12-12 PROCEDURE — 93005 ELECTROCARDIOGRAM TRACING: CPT | Performed by: EMERGENCY MEDICINE

## 2018-12-12 PROCEDURE — 700117 HCHG RX CONTRAST REV CODE 255: Performed by: EMERGENCY MEDICINE

## 2018-12-12 PROCEDURE — 85025 COMPLETE CBC W/AUTO DIFF WBC: CPT

## 2018-12-12 PROCEDURE — 83690 ASSAY OF LIPASE: CPT

## 2018-12-12 PROCEDURE — 81003 URINALYSIS AUTO W/O SCOPE: CPT

## 2018-12-12 PROCEDURE — 96375 TX/PRO/DX INJ NEW DRUG ADDON: CPT

## 2018-12-12 PROCEDURE — 36415 COLL VENOUS BLD VENIPUNCTURE: CPT

## 2018-12-12 PROCEDURE — 74177 CT ABD & PELVIS W/CONTRAST: CPT

## 2018-12-12 RX ORDER — ONDANSETRON 2 MG/ML
4 INJECTION INTRAMUSCULAR; INTRAVENOUS ONCE
Status: COMPLETED | OUTPATIENT
Start: 2018-12-12 | End: 2018-12-12

## 2018-12-12 RX ORDER — MORPHINE SULFATE 10 MG/ML
4 INJECTION, SOLUTION INTRAMUSCULAR; INTRAVENOUS ONCE
Status: COMPLETED | OUTPATIENT
Start: 2018-12-12 | End: 2018-12-12

## 2018-12-12 RX ADMIN — ONDANSETRON 4 MG: 2 INJECTION INTRAMUSCULAR; INTRAVENOUS at 22:22

## 2018-12-12 RX ADMIN — MORPHINE SULFATE 4 MG: 10 INJECTION INTRAVENOUS at 22:22

## 2018-12-12 RX ADMIN — IOHEXOL 100 ML: 350 INJECTION, SOLUTION INTRAVENOUS at 23:45

## 2018-12-12 ASSESSMENT — PAIN DESCRIPTION - DESCRIPTORS: DESCRIPTORS: CRAMPING;STABBING

## 2018-12-12 NOTE — ED NOTES
Pt understands discharge instructions follow up and prescriptions given, does not want last dose of PIV meds.   Denies pain, n/v

## 2018-12-12 NOTE — ED PROVIDER NOTES
ED Provider Note    CHIEF COMPLAINT  Chief Complaint   Patient presents with   • Abdominal Pain     pt states waking in the middle of the night vomiting/ states coffee grounds in emesis/ hx of ulcer and vomiting   • Vomiting   • N/V       HPI  Joselito Pool is a 28 y.o. male with a history of peptic ulcer disease 10 years ago who presents with complaints of nausea, vomiting, and upper abdominal pain for the past day.  The patient says he woke up in the middle night last night with nausea followed by multiple episodes of vomiting.  Patient says they were somewhat dark colored like coffee grounds.  The patient said he had an episode of vomiting that lasted for 5 days about a week ago.  He has had no diarrhea.  He says the symptoms of pain are similar to when he had an ulcer when he was 19 years old.  He never had an endoscopy, but was taking Prilosec for about 1 year.  The patient denies any bloody stools or black stools.  He rates his pain is severe and points to the mid to upper abdomen.  He denies fever, shaking chills, sore throat, cough, congestion, or any difficulty breathing.  He has had no diarrhea.    REVIEW OF SYSTEMS  See HPI for further details. All other systems are negative.     PAST MEDICAL HISTORY  History reviewed. No pertinent past medical history.    FAMILY HISTORY  Family History   Problem Relation Age of Onset   • Dementia Father    • Cancer Father    • Diabetes Father    • Heart Disease Father    • Hypertension Father    • Hyperlipidemia Father    • Alcohol/Drug Father    • Cancer Maternal Grandmother    • Heart Disease Maternal Grandfather        SOCIAL HISTORY  Social History     Social History   • Marital status: Single     Spouse name: N/A   • Number of children: N/A   • Years of education: N/A     Social History Main Topics   • Smoking status: Current Some Day Smoker     Packs/day: 0.25     Years: 10.00   • Smokeless tobacco: Never Used   • Alcohol use Yes      Comment: occ   • Drug use:  "No      Comment: quit 2017   • Sexual activity: Yes     Partners: Female     Birth control/ protection: Condom     Other Topics Concern   • Not on file     Social History Narrative   • No narrative on file       SURGICAL HISTORY  History reviewed. No pertinent surgical history.    CURRENT MEDICATIONS  Home Medications    **Home medications have not yet been reviewed for this encounter**         ALLERGIES  No Known Allergies    PHYSICAL EXAM  VITAL SIGNS: /76   Pulse 71   Temp 36.4 °C (97.6 °F) (Oral)   Resp 18   Ht 1.88 m (6' 2\")   Wt 97.5 kg (214 lb 15.2 oz)   SpO2 98%   BMI 27.60 kg/m²   Constitutional: Awake, alert, in no acute distress, holding an emesis bag with some brownish material.  HENT: Atraumatic. Bilateral external ears normal, mucous membranes dry, throat nonerythematous without exudates, nose is normal.  Eyes: PERRL, EOMI, conjunctiva moist, noninjected.  Neck: Nontender, Normal range of motion, No nuchal rigidity, No stridor.   Lymphatic: No lymphadenopathy noted.   Cardiovascular: Regular rate and rhythm, no murmurs, rubs, gallops.  Thorax & Lungs:  Good breath sounds bilaterally, no wheezes, rales, or retractions.  No chest tenderness.  Abdomen: Bowel sounds normal, Soft, nondistended, there is tenderness to the epigastrium and upper periumbilical area, no tenderness to right upper quadrant, no Tobin sign, no tenderness to lower abdomen, no tenderness at McBurney's point, no rebound, guarding, masses.  Back: No CVA or spinal tenderness.  Extremities: Intact distal pulses, No edema, No tenderness.   Skin: Warm, Dry, No rashes.   Musculoskeletal: No joint swelling or tenderness.  Neurologic: Alert & oriented x 3, sensory and motor function normal. No focal deficits.   Psychiatric: Affect normal, Judgment normal, Mood normal.         RADIOLOGY/PROCEDURES  US-RUQ   Final Result      No evidence of gallstones or biliary ductal dilatation.      Heterogeneous and echogenic appearance of the " liver can be seen in hepatic steatosis or hepatocellular disease.                  COURSE & MEDICAL DECISION MAKING  Pertinent Labs & Imaging studies reviewed. (See chart for details)  The patient presents with the above complaints.  Clinically he appears dehydrated and has active vomiting.  He is not able to tolerate oral fluids.  IV was placed, he was given a bolus of normal saline, morphine, and Zofran.  CBC is normal with white count 7500, normal differential, chemistry shows a potassium 3.1, glucose 118, otherwise normal.  On recheck the patient continued complaining of nausea, vomiting, and abdominal pain.  He was given a second dose of morphine and Zofran.  Ultrasound of the right upper quadrant shows no evidence of gallstones or biliary ductal dilatation.  Patient did have what appeared to be a fatty liver.  On recheck he was feeling much better, appears hydrated.  He is now tolerating oral fluids and drank about 3 glass of ice water without difficulty.  The patient will be placed on a course of Prilosec, Carafate, and Zofran.  He is referred to the [facility] clinic to establish primary care.  He is also referred to GI consultants for possible endoscopy.  He is to return to the ER for worsening pain, recurrent vomiting, bloody stools or black stools, worsening pain, fever, or any other problems.    FINAL IMPRESSION  1.  Acute nausea, vomiting  2.  Epigastric abdominal pain  3.  Acute gastritis         Electronically signed by: Frederic Huntley, 12/11/2018 8:28 PM

## 2018-12-13 PROCEDURE — 700111 HCHG RX REV CODE 636 W/ 250 OVERRIDE (IP): Performed by: EMERGENCY MEDICINE

## 2018-12-13 PROCEDURE — A9270 NON-COVERED ITEM OR SERVICE: HCPCS | Performed by: EMERGENCY MEDICINE

## 2018-12-13 PROCEDURE — 700102 HCHG RX REV CODE 250 W/ 637 OVERRIDE(OP): Performed by: EMERGENCY MEDICINE

## 2018-12-13 PROCEDURE — 96376 TX/PRO/DX INJ SAME DRUG ADON: CPT

## 2018-12-13 RX ORDER — HYDROCODONE BITARTRATE AND ACETAMINOPHEN 5; 325 MG/1; MG/1
1 TABLET ORAL ONCE
Status: COMPLETED | OUTPATIENT
Start: 2018-12-13 | End: 2018-12-13

## 2018-12-13 RX ORDER — RANITIDINE 300 MG/1
300 TABLET ORAL DAILY
Qty: 60 TAB | Refills: 3 | Status: SHIPPED | OUTPATIENT
Start: 2018-12-13

## 2018-12-13 RX ORDER — ONDANSETRON 2 MG/ML
4 INJECTION INTRAMUSCULAR; INTRAVENOUS ONCE
Status: COMPLETED | OUTPATIENT
Start: 2018-12-13 | End: 2018-12-13

## 2018-12-13 RX ADMIN — HYDROCODONE BITARTRATE AND ACETAMINOPHEN 1 TABLET: 5; 325 TABLET ORAL at 00:51

## 2018-12-13 RX ADMIN — ONDANSETRON 4 MG: 2 INJECTION INTRAMUSCULAR; INTRAVENOUS at 00:51

## 2018-12-13 NOTE — ED NOTES
Pt brought from triage in a wheelchair c/o sever abdominal pain. Pt is squirming around in bed repeatedly stating he is in pain, family at BS. Pt able to use the urinal, urin collected, sent.

## 2018-12-13 NOTE — ED PROVIDER NOTES
"ED Provider Note    ED Provider Note    Primary care provider: Angie Napoles M.D.  Means of arrival: Walk-in  History obtained from: Patient    CHIEF COMPLAINT  Chief Complaint   Patient presents with   • Abdominal Pain     Pt complains of abd pain x1 day. Pt was evaluated in ED yesterday. Pt states that pain is getting worse. Pt states that pain is 10/10 \"and getting worse. Pt states that last BM was earlier today, and he was \"a little constipated.\" Pt states that he has taken the prilosec, zofran, and carafate today as prescribed yesterday, with no relief in symptoms.      Seen at 10:04 PM.   HPI  Joselito Pool is a 28 y.o. male who presents to the Emergency Department with approximately 48 hours of constant but waxing and waning nonradiating severe epigastric abdominal pain.  He also has had significant associated vomiting.  The pain does not appear to have any modifying factors.  The patient initially was vomiting coffee-ground emesis but for the past 12 hours he has not had any significant p.o. intake and feels that there is been almost no vomiting and predominately dry heaves.    He denies any fevers, chills, lightheadedness, chest pain, shortness of breath, dysuria, hematuria, diarrhea, melena, hematochezia.  He smokes occasional cannabis but not on a daily basis, he does not use daily anti-inflammatories.  He does not have any history of prior abdominal surgeries or chronic abdominal pain.  He has never had an endoscopy or colonoscopy.    REVIEW OF SYSTEMS  See HPI,   Remainder of ROS negative.     PAST MEDICAL HISTORY    Reports a history of chronic GERD for which he does not taking daily medications.  He states he saw many specialist when he was 17 years old.  He denies ever having history of endoscopy.    SURGICAL HISTORY  patient denies any surgical history    SOCIAL HISTORY  Social History   Substance Use Topics   • Smoking status: Current Some Day Smoker     Packs/day: 0.25     Years: 10.00   • " "Smokeless tobacco: Never Used   • Alcohol use Yes      Comment: occ      History   Drug Use No     Comment: quit 2017       FAMILY HISTORY  Family History   Problem Relation Age of Onset   • Dementia Father    • Cancer Father    • Diabetes Father    • Heart Disease Father    • Hypertension Father    • Hyperlipidemia Father    • Alcohol/Drug Father    • Cancer Maternal Grandmother    • Heart Disease Maternal Grandfather        CURRENT MEDICATIONS  Reviewed.  See Encounter Summary.     ALLERGIES  No Known Allergies    PHYSICAL EXAM  VITAL SIGNS: /81   Pulse 82   Temp 37.3 °C (99.1 °F) (Temporal)   Resp 19   Ht 1.88 m (6' 2\")   Wt 97.5 kg (215 lb)   SpO2 93%   BMI 27.60 kg/m²   Constitutional: Awake, alert in no apparent distress.  Intermittently wincing and guarding abdomen.  HENT: Normocephalic, Bilateral external ears normal. Nose normal.   Eyes: Conjunctiva normal, non-icteric, EOMI.    Thorax & Lungs: Easy unlabored respirations, Clear to ascultation bilaterally.  Cardiovascular: Regular rate, Regular rhythm, No murmurs, rubs or gallops.  Abdomen:  Soft, point tenderness in the upper epigastric region, no right upper quadrant, left upper quadrant or bilateral lower quadrant tenderness, no rebound, no guarding, nondistended, normal active bowel sounds.   :    Skin: Visualized skin is  Dry, No erythema, No rash.   Musculoskeletal:   No cyanosis, clubbing or edema.  Neurologic: Alert, Grossly non-focal.   Psychiatric: Normal affect, Normal mood  Lymphatic:  No cervical LAD  RADIOLOGY  CT-ABDOMEN-PELVIS WITH   Final Result         1. No acute abnormality in the abdomen or pelvis.   2. Mild splenomegaly.            COURSE & MEDICAL DECISION MAKING  Pertinent Labs & Imaging studies reviewed. (See chart for details)    Differential diagnoses include but are not limited to: Peptic ulcer disease, gastritis, much less likely duodenal diverticulitis, pancreatitis, gastric outlet obstruction    10:04 PM - " Medical record reviewed, patient seen and examined at bedside.  Patient was evaluated less than 24 hours ago, labs are unremarkable, the patient underwent a right upper quadrant ultrasound that was unremarkable as well.  Given that this is the patient's second visit in 24 hours and he states the pain is quite severe CT with contrast will be obtained for further characterization.    12:24 AM -patient appears improved.  I explained the unremarkable test results and treatment plan.      Decision Making:  This is a 28 y.o. year old male who presents with 24 hours of abdominal pain and vomiting.  Initially the patient denies any history of chronic abdominal pain however on reassessment he admits to having chronic gastritis and GERD since he was 17 years of age.  He has never had an endoscopy.    CT is unremarkable.  The patient does not have any surgical emergency.  Labs are reassuring.  The patient has only slight ketones in the urine but is not clinically dehydrated.  At this point I feel that he is a excellent outpatient candidate.  I have added an H2 blocker to the PPI as it will have faster results.  I do recommend he follow-up with gastroenterology.      I discussed dietary modifications.    Discharge Medications:  Discharge Medication List as of 12/13/2018 12:55 AM      START taking these medications    Details   ranitidine (ZANTAC) 300 MG tablet Take 1 Tab by mouth every day., Disp-60 Tab, R-3, Normal             The patient was discharged home (see d/c instructions) and parent was told to return immediately for any signs or symptoms listed, or any worsening at all.  The patient's parent verbally agreed to the discharge precautions and follow-up plan which is documented in EPIC.    The patient's blood pressure is elevated today. >120/80. I have referred them to primary care for follow up.       FINAL IMPRESSION  1. Epigastric abdominal pain

## 2018-12-13 NOTE — ED TRIAGE NOTES
"Joselito Pool  Chief Complaint   Patient presents with   • Abdominal Pain     Pt complains of abd pain x1 day. Pt was evaluated in ED yesterday. Pt states that pain is getting worse. Pt states that pain is 10/10 \"and getting worse. Pt states that last BM was earlier today, and he was \"a little constipated.\" Pt states that he has taken the prilosec, zofran, and carafate today as prescribed yesterday, with no relief in symptoms.      Pt wheeled to triage with above complaint.     /81   Pulse 89   Temp 37.3 °C (99.1 °F) (Temporal)   Resp 19   Ht 1.88 m (6' 2\")   Wt 97.5 kg (215 lb)   SpO2 96%   BMI 27.60 kg/m²     Pt informed of triage process and encouraged to notify staff of any changes or concerns. Pt verbalized understanding of instructions. Apologized for long wait time. Pt placed back in lobby.     "

## 2019-01-31 LAB — EKG IMPRESSION: NORMAL
